# Patient Record
Sex: FEMALE | Race: WHITE
[De-identification: names, ages, dates, MRNs, and addresses within clinical notes are randomized per-mention and may not be internally consistent; named-entity substitution may affect disease eponyms.]

---

## 2020-09-17 ENCOUNTER — HOSPITAL ENCOUNTER (INPATIENT)
Dept: HOSPITAL 54 - GPS | Age: 75
LOS: 19 days | Discharge: NURSING, REHAB OR LONG-TERM CARE | DRG: 885 | End: 2020-10-06
Attending: NURSE PRACTITIONER | Admitting: PSYCHIATRY & NEUROLOGY
Payer: MEDICARE

## 2020-09-17 VITALS — DIASTOLIC BLOOD PRESSURE: 78 MMHG | SYSTOLIC BLOOD PRESSURE: 148 MMHG

## 2020-09-17 VITALS — HEIGHT: 65 IN | BODY MASS INDEX: 43.65 KG/M2 | WEIGHT: 262 LBS
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
## 2020-09-17 DIAGNOSIS — E78.5: ICD-10-CM

## 2020-09-17 DIAGNOSIS — M17.0: ICD-10-CM

## 2020-09-17 DIAGNOSIS — T50.2X5A: ICD-10-CM

## 2020-09-17 DIAGNOSIS — Z91.19: ICD-10-CM

## 2020-09-17 DIAGNOSIS — Z73.6: ICD-10-CM

## 2020-09-17 DIAGNOSIS — F03.90: ICD-10-CM

## 2020-09-17 DIAGNOSIS — E87.6: ICD-10-CM

## 2020-09-17 DIAGNOSIS — E83.39: ICD-10-CM

## 2020-09-17 DIAGNOSIS — I10: ICD-10-CM

## 2020-09-17 DIAGNOSIS — E83.42: ICD-10-CM

## 2020-09-17 DIAGNOSIS — E11.9: ICD-10-CM

## 2020-09-17 DIAGNOSIS — D68.59: ICD-10-CM

## 2020-09-17 DIAGNOSIS — F29: Primary | ICD-10-CM

## 2020-09-17 DIAGNOSIS — F01.50: ICD-10-CM

## 2020-09-17 DIAGNOSIS — Z90.710: ICD-10-CM

## 2020-09-17 DIAGNOSIS — E66.01: ICD-10-CM

## 2020-09-17 DIAGNOSIS — F22: ICD-10-CM

## 2020-09-17 DIAGNOSIS — G93.40: ICD-10-CM

## 2020-09-17 DIAGNOSIS — Z91.14: ICD-10-CM

## 2020-09-17 DIAGNOSIS — E43: ICD-10-CM

## 2020-09-17 RX ADMIN — ACETAMINOPHEN PRN MG: 325 TABLET ORAL at 22:05

## 2020-09-17 NOTE — NUR
GPS ADMISSION NOTE :

RECEIVED PATIENT FROM [facility] MARIA A CHRISTOPHER . PATIENT ARRIVED ON 
THIS UNIT AT VIA WHEELCHAIR WITH 1 EMT ESCORT.  PATIENT ADMITTED ON A 5150 HOLD FOR DTS,GD.  
PER HOLD PATIENT DELUSIONAL  SHE BELIEVES SHE NEEDS TO DRIVE TO THE LOCAL AIR PORT BECAUSE 
HER SON IS SENDING A PRIVATE JUT FOR HER  PATIENT UNABLE TO PROVIDE A SAFE PLAN AT THIS TIME 
  THE 5150 WAS REVIEWED AND THE DOCUMENTATION IN THE 5150 HOLD APPEARS TO REFLECT THE 
PRESENTATION OF THE PATIENT.  UPON FACE TO FACE ASSESSMENT PATIENT IS CURRENTLY IN BED 
AWAKE, HAS NO S/S OR COMPLAINTS OF PAIN.  PATIENT IS DISPLAYING NO S/S OF APPARENT DISTRESS. 
 PATIENT BREATHING IS UNLABORED WITH EQUAL RISE AND FALL OF THE CHEST.  PATIENT IS ALERT AND 
ORIENTATED X 2 ON ROOM AIR.  PATIENT ASSISTED WITH TURING AND REPOSITIONING Q2HR AND PRN FOR 
COMFORT AND CIRCULATION.  PATIENT HAS NO NEEDS AT THIS TIME.  PATIENT IS NOTED TO BEING 
DELUSIONAL, DISHEVELED, DISORGANIZED, COOPERATIVE, AND NEEDS REDIRECTION.  PATIENT DENIES 
SUICIDE IDEATIONS AND HOMICIDAL IDEATIONS AT THIS TIME.  PATIENT IS UNDER THE PSYCHIATRIC 
CARE OF DR. DARNELL AND THE MEDICAL CARE OF DR BARRETO.  PATIENT BELONGINGS WERE INVENTORIED 
AND CHECKED FOR CONTRABAND.  ALL CONTRABAND REMOVED AND STORED IN PATIENT HALLWAY LOCKER.  
PATIENT ADVANCED DIRECTIVES PREFERENCE, IMMUNIZATIONS QUESTIONER COMPLETED.  PATIENT 
EDUCATED ON THE USE OF THE CALL BELL.  PATIENT BED SIDE RAILS ARE UP X 2 FOR SAFETY.  
PATIENT BED IS LOCKED, LOW AND I WILL CONTINUE TO MONITOR THIS PATIENT Q 15 MIN WITH THE 
HELP OF STAFF TO MAINTAIN SAFETY.  WILL INDORSE TO INCOMING SHIFT NURSE OF FULL ADMISSION 
AND CONTINUATION OF CARE

## 2020-09-17 NOTE — NUR
GPS-RN NOTE: C/O BILATERAL KNEE PAIN



PATIENT C/O BILATERAL KNEE PAIN ON A PAIN SCALE OF 3/10. ADMINISTERED ACETAMINOPHEN 650MG PO 
AS ORDERED. WILL CONTINUE TO REASSESS.

## 2020-09-18 VITALS — SYSTOLIC BLOOD PRESSURE: 121 MMHG | DIASTOLIC BLOOD PRESSURE: 51 MMHG

## 2020-09-18 VITALS — DIASTOLIC BLOOD PRESSURE: 67 MMHG | SYSTOLIC BLOOD PRESSURE: 140 MMHG

## 2020-09-18 VITALS — DIASTOLIC BLOOD PRESSURE: 62 MMHG | SYSTOLIC BLOOD PRESSURE: 140 MMHG

## 2020-09-18 LAB
ALBUMIN SERPL BCP-MCNC: 2.6 G/DL (ref 3.4–5)
ALP SERPL-CCNC: 58 U/L (ref 46–116)
ALT SERPL W P-5'-P-CCNC: 22 U/L (ref 12–78)
APPEARANCE UR: CLEAR
AST SERPL W P-5'-P-CCNC: 24 U/L (ref 15–37)
BASOPHILS # BLD AUTO: 0 /CMM (ref 0–0.2)
BASOPHILS NFR BLD AUTO: 0.3 % (ref 0–2)
BILIRUB SERPL-MCNC: 0.6 MG/DL (ref 0.2–1)
BILIRUB UR QL STRIP: NEGATIVE
BUN SERPL-MCNC: 5 MG/DL (ref 7–18)
BUN SERPL-MCNC: 6 MG/DL (ref 7–18)
CALCIUM SERPL-MCNC: 8.6 MG/DL (ref 8.5–10.1)
CALCIUM SERPL-MCNC: 8.6 MG/DL (ref 8.5–10.1)
CHLORIDE SERPL-SCNC: 101 MMOL/L (ref 98–107)
CHLORIDE SERPL-SCNC: 101 MMOL/L (ref 98–107)
CHOLEST SERPL-MCNC: 130 MG/DL (ref ?–200)
CO2 SERPL-SCNC: 29 MMOL/L (ref 21–32)
CO2 SERPL-SCNC: 30 MMOL/L (ref 21–32)
COLOR UR: YELLOW
CREAT SERPL-MCNC: 0.5 MG/DL (ref 0.6–1.3)
CREAT SERPL-MCNC: 0.6 MG/DL (ref 0.6–1.3)
EOSINOPHIL NFR BLD AUTO: 0.4 % (ref 0–6)
GLUCOSE SERPL-MCNC: 117 MG/DL (ref 74–106)
GLUCOSE SERPL-MCNC: 98 MG/DL (ref 74–106)
GLUCOSE UR STRIP-MCNC: NEGATIVE MG/DL
HCT VFR BLD AUTO: 37 % (ref 33–45)
HDLC SERPL-MCNC: 65 MG/DL (ref 40–60)
HGB BLD-MCNC: 12.5 G/DL (ref 11.5–14.8)
HGB UR QL STRIP: NEGATIVE ERY/UL
KETONES UR STRIP-MCNC: NEGATIVE MG/DL
LDLC SERPL DIRECT ASSAY-MCNC: 50 MG/DL (ref 0–99)
LEUKOCYTE ESTERASE UR QL STRIP: NEGATIVE
LYMPHOCYTES NFR BLD AUTO: 1.4 /CMM (ref 0.8–4.8)
LYMPHOCYTES NFR BLD AUTO: 25.9 % (ref 20–44)
MAGNESIUM SERPL-MCNC: 1.7 MG/DL (ref 1.8–2.4)
MCHC RBC AUTO-ENTMCNC: 34 G/DL (ref 31–36)
MCV RBC AUTO: 89 FL (ref 82–100)
MONOCYTES NFR BLD AUTO: 0.8 /CMM (ref 0.1–1.3)
MONOCYTES NFR BLD AUTO: 14.9 % (ref 2–12)
NEUTROPHILS # BLD AUTO: 3.1 /CMM (ref 1.8–8.9)
NEUTROPHILS NFR BLD AUTO: 58.5 % (ref 43–81)
NITRITE UR QL STRIP: NEGATIVE
PH UR STRIP: 6.5 [PH] (ref 5–8)
PHOSPHATE SERPL-MCNC: 1.8 MG/DL (ref 2.5–4.9)
PLATELET # BLD AUTO: 229 /CMM (ref 150–450)
POTASSIUM SERPL-SCNC: 2.3 MMOL/L (ref 3.5–5.1)
POTASSIUM SERPL-SCNC: 2.6 MMOL/L (ref 3.5–5.1)
PROT SERPL-MCNC: 6.4 G/DL (ref 6.4–8.2)
PROT UR QL STRIP: NEGATIVE MG/DL
RBC # BLD AUTO: 4.19 MIL/UL (ref 4–5.2)
RBC #/AREA URNS HPF: (no result) /HPF (ref 0–2)
SODIUM SERPL-SCNC: 139 MMOL/L (ref 136–145)
SODIUM SERPL-SCNC: 140 MMOL/L (ref 136–145)
TRIGL SERPL-MCNC: 69 MG/DL (ref 30–150)
UROBILINOGEN UR STRIP-MCNC: 1 EU/DL
WBC #/AREA URNS HPF: (no result) /HPF (ref 0–3)
WBC NRBC COR # BLD AUTO: 5.3 K/UL (ref 4.3–11)

## 2020-09-18 RX ADMIN — Medication SCH EACH: at 09:10

## 2020-09-18 RX ADMIN — MAGNESIUM OXIDE TAB 400 MG (241.3 MG ELEMENTAL MG) SCH MG: 400 (241.3 MG) TAB at 09:17

## 2020-09-18 RX ADMIN — AMLODIPINE BESYLATE SCH MG: 5 TABLET ORAL at 09:13

## 2020-09-18 RX ADMIN — Medication SCH PACKET: at 09:55

## 2020-09-18 RX ADMIN — Medication SCH MG: at 09:13

## 2020-09-18 RX ADMIN — MAGNESIUM OXIDE TAB 400 MG (241.3 MG ELEMENTAL MG) SCH MG: 400 (241.3 MG) TAB at 16:14

## 2020-09-18 RX ADMIN — ATENOLOL SCH MG: 50 TABLET ORAL at 09:14

## 2020-09-18 RX ADMIN — ATORVASTATIN CALCIUM SCH MG: 40 TABLET, FILM COATED ORAL at 17:13

## 2020-09-18 RX ADMIN — Medication SCH PACKET: at 16:14

## 2020-09-18 RX ADMIN — Medication SCH TAB: at 09:14

## 2020-09-18 RX ADMIN — Medication SCH PACKET: at 13:47

## 2020-09-18 NOTE — NUR
GPS-RN NOTE: 



CALLED NP MARY LOU REPORTED CRITICAL LAB RESULT OF POTASSIUM 2.3 AND LOW MAGNESIUM OF 1.7. 
RECEIVED ORDERS NOTED AND CARRIED OUT. WILL ENDORSE TO THE DAY SHIFT NURSE FOR CONTINUITY OF 
CARE.

## 2020-09-18 NOTE — NUR
ROOPA Initial Discharge Plan: Patient currently resides [address] home [address] [place] 
[address]; ([phone]). Per pt, she resides with her daughter Sarah ([phone]). This 
writer attempted to contact Sarah, but she was unavailable to gather collateral. This 
writer left a voicemail to contact ROOPA. This writer will work with the MD and treatment team 
to coordinate proper discharge plan.

## 2020-09-18 NOTE — NUR
SW Family Contact:



This writer attempted to contact patient's daughter Sarah ([phone]) to gather 
collateral, however, she was unavailable and this writer left a voicemail.

## 2020-09-19 VITALS — SYSTOLIC BLOOD PRESSURE: 115 MMHG | DIASTOLIC BLOOD PRESSURE: 94 MMHG

## 2020-09-19 VITALS — DIASTOLIC BLOOD PRESSURE: 75 MMHG | SYSTOLIC BLOOD PRESSURE: 129 MMHG

## 2020-09-19 VITALS — SYSTOLIC BLOOD PRESSURE: 106 MMHG | DIASTOLIC BLOOD PRESSURE: 71 MMHG

## 2020-09-19 VITALS — DIASTOLIC BLOOD PRESSURE: 87 MMHG | SYSTOLIC BLOOD PRESSURE: 122 MMHG

## 2020-09-19 LAB
ALBUMIN SERPL BCP-MCNC: 2.9 G/DL (ref 3.4–5)
ALP SERPL-CCNC: 68 U/L (ref 46–116)
ALT SERPL W P-5'-P-CCNC: 25 U/L (ref 12–78)
AST SERPL W P-5'-P-CCNC: 21 U/L (ref 15–37)
BASOPHILS # BLD AUTO: 0 /CMM (ref 0–0.2)
BASOPHILS NFR BLD AUTO: 0.3 % (ref 0–2)
BILIRUB SERPL-MCNC: 0.5 MG/DL (ref 0.2–1)
BUN SERPL-MCNC: 5 MG/DL (ref 7–18)
CALCIUM SERPL-MCNC: 8.7 MG/DL (ref 8.5–10.1)
CHLORIDE SERPL-SCNC: 99 MMOL/L (ref 98–107)
CO2 SERPL-SCNC: 31 MMOL/L (ref 21–32)
CREAT SERPL-MCNC: 0.8 MG/DL (ref 0.6–1.3)
EOSINOPHIL NFR BLD AUTO: 0.3 % (ref 0–6)
GLUCOSE SERPL-MCNC: 121 MG/DL (ref 74–106)
HCT VFR BLD AUTO: 40 % (ref 33–45)
HGB BLD-MCNC: 13.2 G/DL (ref 11.5–14.8)
LYMPHOCYTES NFR BLD AUTO: 1.8 /CMM (ref 0.8–4.8)
LYMPHOCYTES NFR BLD AUTO: 38.3 % (ref 20–44)
LYMPHOCYTES NFR BLD MANUAL: 22 % (ref 16–48)
MAGNESIUM SERPL-MCNC: 1.7 MG/DL (ref 1.8–2.4)
MCHC RBC AUTO-ENTMCNC: 33 G/DL (ref 31–36)
MCV RBC AUTO: 89 FL (ref 82–100)
MONOCYTES NFR BLD AUTO: 0.7 /CMM (ref 0.1–1.3)
MONOCYTES NFR BLD AUTO: 15.9 % (ref 2–12)
MONOCYTES NFR BLD MANUAL: 15 % (ref 0–11)
NEUTROPHILS # BLD AUTO: 2.1 /CMM (ref 1.8–8.9)
NEUTROPHILS NFR BLD AUTO: 45.2 % (ref 43–81)
NEUTS SEG NFR BLD MANUAL: 63 % (ref 42–76)
PHOSPHATE SERPL-MCNC: 2.5 MG/DL (ref 2.5–4.9)
PLATELET # BLD AUTO: 251 /CMM (ref 150–450)
POTASSIUM SERPL-SCNC: 2.7 MMOL/L (ref 3.5–5.1)
PROT SERPL-MCNC: 7 G/DL (ref 6.4–8.2)
RBC # BLD AUTO: 4.43 MIL/UL (ref 4–5.2)
SODIUM SERPL-SCNC: 136 MMOL/L (ref 136–145)
WBC NRBC COR # BLD AUTO: 4.6 K/UL (ref 4.3–11)

## 2020-09-19 RX ADMIN — Medication SCH EACH: at 09:00

## 2020-09-19 RX ADMIN — Medication SCH TAB: at 09:56

## 2020-09-19 RX ADMIN — ATENOLOL SCH MG: 50 TABLET ORAL at 12:30

## 2020-09-19 RX ADMIN — Medication SCH MG: at 09:56

## 2020-09-19 RX ADMIN — ATORVASTATIN CALCIUM SCH MG: 40 TABLET, FILM COATED ORAL at 18:19

## 2020-09-19 RX ADMIN — MAGNESIUM OXIDE TAB 400 MG (241.3 MG ELEMENTAL MG) SCH MG: 400 (241.3 MG) TAB at 18:18

## 2020-09-19 RX ADMIN — AMLODIPINE BESYLATE SCH MG: 5 TABLET ORAL at 16:19

## 2020-09-19 RX ADMIN — MAGNESIUM OXIDE TAB 400 MG (241.3 MG ELEMENTAL MG) SCH MG: 400 (241.3 MG) TAB at 09:56

## 2020-09-19 RX ADMIN — Medication SCH EACH: at 09:54

## 2020-09-19 NOTE — NUR
GPS RN NOTE: ANXIETY

PT WAS FEELING RESTLESS AND IRRITABLE, ASKED PT IF SHE'D LIKE TO TRY ATIVAN TO HELP WITH HER 
COMPLAINT SHE SAID "SURE LET ME TRY THAT". ADMIN PRN ATIVAN. WILL REASSESS AND CONTINUE TO 
MONITOR Q15MIN FOR SAFETY AND BEHAVIOR

## 2020-09-19 NOTE — NUR
GPS RN NOTE: SKIN ASSESSMENT

9/18/20 @ 0364 NOTICED WHEN THE PT WAS WALKING TO RESTROOM HER FEET WERE SWOLLEN, PTS 
FEET/ANKLE WERE +3, NON-PITTING EDEMA. PLACED PICTURES IN CHART, WOUND CONSULT NOT NEEDED AT 
THIS TIME. WILL PASS IT ON TO MORNING NURSE TO NOTIFY INTERNIST WHEN MAKING ROUNDS, PT 
COMPLAINTS OF NO PAIN OTHER THAN WHEN APPLYING PRESSURE. ELEVATED PTS LEGS, WILL CONTINUE TO 
MONITOR Q15MIN FOR SAFETY AND BEHAVIOR AND ROTATE SITE E. 2HRS.

## 2020-09-19 NOTE — NUR
fransisca np aware potassium level 2.7 with lab draw this afternoon,ordered labs for 
tomorrow.lab drawn late only short time after kcl given.

## 2020-09-19 NOTE — NUR
GPS RN NOTE: REFUSED RISPERDAL



PATIENT REFUSED TO TAKE RISPERDAL AT 2200 AS SCHEDULED. SEEN BY DR. DARNELL AT THIS TIME & 
MD EXPLAINED THAT MEDICINE WILL HELP HER TO GET BETTER BUT PATIENT KEPT SAYING," I WANT TO 
GET OFF OF MEDICATION, I DON'T NEED IT." DESPITE OF RISKS & BENEFITS EXPLANATIONS, PATIENT 
CONTINUED TO REFUSE RISPERDAL AS SCHEDULED.

## 2020-09-19 NOTE — NUR
GPS RN NOTE: PT REFUSED BLOOD DRAW

PT REFUSED BLOOD DRAW, REMINDED PT 3X OF THE REASON FOR BLOOD DRAW, THE NEED TO DO SO 
ESPECIALLY DUE TO K+ AND MG LEVELS BEING LOW YESTERDAY BUT PT STILL REFUSED, LAB SAID THEY 
WILL COME BACK IN A FEW HOURS TO TRY AGAIN. I WILL LET UPCOMING NURSE KNOW.

## 2020-09-20 VITALS — DIASTOLIC BLOOD PRESSURE: 43 MMHG | SYSTOLIC BLOOD PRESSURE: 118 MMHG

## 2020-09-20 VITALS — SYSTOLIC BLOOD PRESSURE: 142 MMHG | DIASTOLIC BLOOD PRESSURE: 68 MMHG

## 2020-09-20 VITALS — DIASTOLIC BLOOD PRESSURE: 82 MMHG | SYSTOLIC BLOOD PRESSURE: 140 MMHG

## 2020-09-20 LAB
BUN SERPL-MCNC: 4 MG/DL (ref 7–18)
CALCIUM SERPL-MCNC: 8.9 MG/DL (ref 8.5–10.1)
CHLORIDE SERPL-SCNC: 104 MMOL/L (ref 98–107)
CO2 SERPL-SCNC: 32 MMOL/L (ref 21–32)
CREAT SERPL-MCNC: 0.6 MG/DL (ref 0.6–1.3)
GLUCOSE SERPL-MCNC: 102 MG/DL (ref 74–106)
MAGNESIUM SERPL-MCNC: 1.8 MG/DL (ref 1.8–2.4)
PHOSPHATE SERPL-MCNC: 2.6 MG/DL (ref 2.5–4.9)
POTASSIUM SERPL-SCNC: 2.8 MMOL/L (ref 3.5–5.1)
SODIUM SERPL-SCNC: 142 MMOL/L (ref 136–145)

## 2020-09-20 RX ADMIN — Medication SCH MG: at 09:20

## 2020-09-20 RX ADMIN — Medication SCH TAB: at 09:20

## 2020-09-20 RX ADMIN — ATENOLOL SCH MG: 50 TABLET ORAL at 09:00

## 2020-09-20 RX ADMIN — POTASSIUM CHLORIDE SCH MEQ: 1500 TABLET, EXTENDED RELEASE ORAL at 10:29

## 2020-09-20 RX ADMIN — POTASSIUM CHLORIDE SCH MEQ: 1500 TABLET, EXTENDED RELEASE ORAL at 11:22

## 2020-09-20 RX ADMIN — ATORVASTATIN CALCIUM SCH MG: 40 TABLET, FILM COATED ORAL at 16:11

## 2020-09-20 RX ADMIN — Medication SCH EACH: at 09:20

## 2020-09-20 RX ADMIN — ACETAMINOPHEN PRN MG: 325 TABLET ORAL at 05:50

## 2020-09-20 RX ADMIN — POTASSIUM CHLORIDE SCH MEQ: 1500 TABLET, EXTENDED RELEASE ORAL at 13:08

## 2020-09-20 RX ADMIN — AMLODIPINE BESYLATE SCH MG: 5 TABLET ORAL at 09:22

## 2020-09-20 RX ADMIN — TEMAZEPAM PRN MG: 7.5 CAPSULE ORAL at 22:43

## 2020-09-20 NOTE — NUR
GPS RN NOTE: INSOMNIA



PATIENT IS UNABLE TO SLEEP, ANXIOUS, HYPERVERBAL, PARANOID AT THIS TIME. PRN RESTORIL 7.5 MG 
1 CAP PO GIVEN. WILL CONTINUE TO MONITOR FOR EFFECTIVENESS.

## 2020-09-20 NOTE — NUR
GPS RN NOTE: LEFT KNEE PAIN



PATIENT C/O LEFT KNEE PAIN 3/10 & WANTED TO TAKE TYLENOL. PRN TYLENOL GIVEN AS ORDERED. WILL 
CONTINUE TO MONITOR.

## 2020-09-21 VITALS — SYSTOLIC BLOOD PRESSURE: 130 MMHG | DIASTOLIC BLOOD PRESSURE: 58 MMHG

## 2020-09-21 LAB
BUN SERPL-MCNC: 4 MG/DL (ref 7–18)
CALCIUM SERPL-MCNC: 8.9 MG/DL (ref 8.5–10.1)
CHLORIDE SERPL-SCNC: 103 MMOL/L (ref 98–107)
CO2 SERPL-SCNC: 30 MMOL/L (ref 21–32)
CREAT SERPL-MCNC: 0.6 MG/DL (ref 0.6–1.3)
GLUCOSE SERPL-MCNC: 95 MG/DL (ref 74–106)
MAGNESIUM SERPL-MCNC: 2 MG/DL (ref 1.8–2.4)
PHOSPHATE SERPL-MCNC: 2.3 MG/DL (ref 2.5–4.9)
POTASSIUM SERPL-SCNC: 3.4 MMOL/L (ref 3.5–5.1)
SODIUM SERPL-SCNC: 140 MMOL/L (ref 136–145)

## 2020-09-21 RX ADMIN — Medication SCH TAB: at 08:23

## 2020-09-21 RX ADMIN — Medication SCH EACH: at 08:23

## 2020-09-21 RX ADMIN — ATENOLOL SCH MG: 50 TABLET ORAL at 08:28

## 2020-09-21 RX ADMIN — Medication SCH MG: at 08:24

## 2020-09-21 RX ADMIN — ATORVASTATIN CALCIUM SCH MG: 40 TABLET, FILM COATED ORAL at 18:00

## 2020-09-21 RX ADMIN — AMLODIPINE BESYLATE SCH MG: 5 TABLET ORAL at 08:23

## 2020-09-21 NOTE — NUR
RN NOTES



PATIENT RECEIVED IN BED RESTING COMFORTABLY, ALERT AND ORIENTED X 2. HYPERVERBAL SPEECH 
PRESENT AT THIS TIME, SPEAKING ABOUT GOD, AND HER Jew COMMUNITY, STATING SHE NEEDS TO 
LEAVE. PATIENT ON ROOM AIR WITH NO RESPIRATORY DISTRESS PRESENT AT THIS TIME WITH EVEN 
NON-LABORED BREATHING, WITH NO SOB NOTED. NO APPARENT DISTRESS AT THIS TIME. PATIENT 
PRESENTS WITH NO PAIN OR DISCOMFORT. SAFETY PRECAUTIONS IMPLEMENTED WITH BED LOCKED, BED IN 
THE LOWEST POSITION, BILATERAL SIDE RAILS UP, BED ALARM ON. WILL CONTINUE TO MONITOR 
PATIENT.

## 2020-09-21 NOTE — NUR
RN NOTES



PATIENT HAS SCHEDULE PO POTASSIUM CHLORIDE 20 mEq AS ORDERED, PATIENT REFUSED MEDICATION 
STATING "I DON'T NEED THIS AND NEED TO SEE MY DR, I AM REFUSING THIS MEDICATION." EDUCATED 
THE PATIENT THE REASON FOR MEDICATION AND RISKS AND BENEFITS. PATIENT KEPT REFUSING MULTIPLE 
TIMES. CHARGE NURSE MADE AWARE AND WILL CONTINUE TO MONITOR PATIENT.

## 2020-09-21 NOTE — NUR
RN NOTES



PATIENT REFUSED NEUTRA PHOS K 500mg AS ORDERED. EXPLAINED AND EDUCATED THE PATIENT THE RISKS 
AND BENEFITS OF TAKING THIS MEDICATIONS, AND PATIENT KEPT REFUSING MULTIPLE TIMES. KEPT ON 
EDUCATING AND PATIENT DID NOT WANT TO HEAR IT AND STATING " I AM NOT TAKING ANY 
MEDICATIONS." WILL CONTINUE TO MONITOR PATIENT.

## 2020-09-21 NOTE — NUR
GPS RN NOTES: REFUSED PM MEDS

PT REFUSED RISPERDAL 0.5MG PO AS ORDERED. PT STATED, "THE LORD TELLS ME WHAT TO DO. NOT 
YOU." EXPLAIN RISKS AND BENEFITS. PT STILL REFUSED X3. CONTINUE TO MONITOR.

## 2020-09-21 NOTE — NUR
GPS RN NOTES:

UPON DOING ROUNDS PT AWAKE. OFFERED PT SLEEPING MEDICATION RESTORIL. PT REFUSED AND STATED, 
"NO. I WILL NOT TAKE ANYTHING YOU WILL GIVE ME. THE LORD IS IN MY PRAYERS." EXPLAIN RISKS 
AND BENEFITS OF SLEEP. PT STILL REFUSED X3. CONTINUE TO MONITOR.

## 2020-09-21 NOTE — NUR
Family Contact: Pt's daughter Sarah ([phone]) called the SW and stated that she 
wanted an update regarding the pts diagnosis and discharge plan. SW informed her of the 
diagnosis and stated that she is going to be sending out referrals for the pt today. SW 
stated that she will update her the following day with an accepting facility. Pts daughter 
reinforced that this placement is going to be temporary as the plan is to discharge the pt 
to a facility in [place].

## 2020-09-21 NOTE — NUR
SNF Referral: ROOPA faxed a referral to [facility] with attention to 
Blessing to the fax number: [phone].

## 2020-09-21 NOTE — NUR
RN NOTES



PATIENT REFUSED MEDICATION ATORVASTATIN PO 40mg AS ORDERED, STATING "I DON'T WANT TO TAKE 
ANY MEDICATIONS!" EDUCATED THE PATIENT THE BENEFITS AND RISKS OF NOT TAKING THE MEDICATION 
MULTIPLE TIMES AND PATIENT KEPT REFUSING. WILL CONTINUE TO MONITOR PATIENT AT THIS TIME.

## 2020-09-22 VITALS — SYSTOLIC BLOOD PRESSURE: 129 MMHG | DIASTOLIC BLOOD PRESSURE: 61 MMHG

## 2020-09-22 VITALS — DIASTOLIC BLOOD PRESSURE: 50 MMHG | SYSTOLIC BLOOD PRESSURE: 113 MMHG

## 2020-09-22 RX ADMIN — ATORVASTATIN CALCIUM SCH MG: 40 TABLET, FILM COATED ORAL at 18:00

## 2020-09-22 RX ADMIN — Medication SCH MG: at 09:00

## 2020-09-22 RX ADMIN — Medication SCH TAB: at 09:00

## 2020-09-22 RX ADMIN — AMLODIPINE BESYLATE SCH MG: 5 TABLET ORAL at 09:00

## 2020-09-22 RX ADMIN — ATENOLOL SCH MG: 50 TABLET ORAL at 09:00

## 2020-09-22 RX ADMIN — Medication SCH EACH: at 09:00

## 2020-09-22 NOTE — NUR
GPS RN NOTES: 

UPON DOING ROUNDS PT WOKE UP BANGING ON ROOM WINDOW, THROWING SHEETS AND PILLOW, PT VERBALLY 
ABUSIVE TOWARDS STAFF, AND ATTEMPTING TO HIT STAFF. ENCOURAGE TO EXPRESS THOUGHTS AND 
FEELINGS TOWARDS STAFF. PT YELLING, SCREAMING,LAUGHING, CRYING, SINGING, CURSING AND 
PRAISING. PT INCREASED AGITATION, AGGRESSIVE, AND DANGER TO OTHERS. PAGED DR DARNELL [id number]. 
AWAITING FOR CALL AND NEW ORDERS. AT 0337 DR DARNELL CALLED BACK WITH NEW ORDERS OF 
EMERGENCY SHOT HALDOL 2MG IM X1 AND ATIVAN 1MG IM X1. ORDERS NOTED AND CARRIED OUT. BEFORE 
GIVING MEDS, PT REFUSED VITALS TO BE TAKEN. NO SOB. NO RESP DISTRESS. BREATHING EVEN AND 
UNLABORED. PT BECAME MORE AGITATED.  ADMINISTERED MEDICATION IM ON PTS RIGHT GLUTEAL MUSCLE 
PER ORDER PHYSICALLY HELD BY CNA AND SECURITY. TOLERATED MEDICATION WELL. CONTINUE TO 
MONITOR Q15 MIN           

-------------------------------------------------------------------------------

Addendum: 09/22/20 at 0611 by KEVIN YARBROUGH RN

-------------------------------------------------------------------------------

PT PULLING OUT ROOM CURTAIN BY THE WINDOW

## 2020-09-22 NOTE — NUR
GPS RN NOTES: 

RECEIVED CALL BACK FROM DR DARNELL WITH N.O. ORDERS FOR IM EMERGENCY SHOT ATIVAN 1MG IM X1 
AND HALDOL 2MG X1. PT REFUSED VITALS. NO SOB. NO RESP DISTRESS.  ADMINISTERED MEDICATION 
THROUGH PTS GLUTEAL LEFT MUSCLE. TOLERATED MEDICATION WELL. CONTINUE TO MONITOR.

## 2020-09-22 NOTE — NUR
GPS RN NOTES: 

UPON DOING ROUNDS PT TRYING TO GET OUT OF BED AND PRAISING LOUDLY. ENCOURAGE PT EXPRESS 
THOUGHTS AND FEELINGS. PT YELLING, VERBALLY AGGRESSIVE TOWARDS STAFF, AND THROWING LINEN. PT 
INCREASED AGITATION AND STATED, "IN THE NAME OF LORD GET OUT OF HERE. " PAGED DR DARNELL AND 
LEFT A MESSAGE AND AWAITING FOR MD TO CALL BACK. WILL CONTINUE TO MONITOR PT.

## 2020-09-22 NOTE — NUR
RN NOTE- PT COMBATIVE OPPOSITIONAL TO CARE AND RX. PSYCHOTIC AND DELUSIONAL. CURRENTLY QUIET 
AFTER TWO EARLY AM EMERGENCY RX IM SHOTS. STILL W DELUSIONS REGARDING GER PASQUALE AND THE 
DEVIL. MONITORING BEHAVIORS CLOSELY

## 2020-09-22 NOTE — NUR
RN NOTE- PT RECEIVED EMERGENCY IM AT SHIFT CHANGE. PT QUIET SINCE THOUGH IRRITABLE AND 
OPPOSITIONAL TO ANY CARE. PT DELUSIONAL AND PSYCHOTIC. REFUSING ALL FOOD DRINK AND MEDS. 
MONITORING BEHAVIOR

## 2020-09-22 NOTE — NUR
RN NOTE- PT FOUND TO HAVE TIED SHEET IN LOOP AROUND SHOULDERS AND NECK . ORDERED TO REMOVE. 
PT REFUSED. BECAME AGITATED. DR DARNELL AND ALEX NOTIFIED. SECURITY TO UNIT. REMOVED 
SHEET W STAFF. DR DARNELL ORDERED REISE PAPERWORK AND EMERGENCY IM ATIVAN 2 MG AND HALDOL 5 
MG. COMPLIYING.

## 2020-09-22 NOTE — NUR
GPS RN NOTES: REFUSED LABS

PT REFUSED AM LABS. EXPLAIN RISK AND BENEFITS. PT REFUSED X3. PT STATED, "NO NOT NOW. LET ME 
BE " WILL ENDORSE TO DAY SHIFT. CONTINUE TO MONITOR

## 2020-09-23 VITALS — DIASTOLIC BLOOD PRESSURE: 67 MMHG | SYSTOLIC BLOOD PRESSURE: 135 MMHG

## 2020-09-23 VITALS — SYSTOLIC BLOOD PRESSURE: 152 MMHG | DIASTOLIC BLOOD PRESSURE: 83 MMHG

## 2020-09-23 VITALS — DIASTOLIC BLOOD PRESSURE: 63 MMHG | SYSTOLIC BLOOD PRESSURE: 157 MMHG

## 2020-09-23 LAB
ALBUMIN SERPL BCP-MCNC: 2.6 G/DL (ref 3.4–5)
ALP SERPL-CCNC: 70 U/L (ref 46–116)
ALT SERPL W P-5'-P-CCNC: 26 U/L (ref 12–78)
AST SERPL W P-5'-P-CCNC: 42 U/L (ref 15–37)
BASOPHILS # BLD AUTO: 0 /CMM (ref 0–0.2)
BASOPHILS NFR BLD AUTO: 0.2 % (ref 0–2)
BILIRUB SERPL-MCNC: 0.4 MG/DL (ref 0.2–1)
BUN SERPL-MCNC: 6 MG/DL (ref 7–18)
CALCIUM SERPL-MCNC: 9.1 MG/DL (ref 8.5–10.1)
CHLORIDE SERPL-SCNC: 103 MMOL/L (ref 98–107)
CO2 SERPL-SCNC: 27 MMOL/L (ref 21–32)
CREAT SERPL-MCNC: 0.6 MG/DL (ref 0.6–1.3)
EOSINOPHIL NFR BLD AUTO: 0.3 % (ref 0–6)
GLUCOSE SERPL-MCNC: 116 MG/DL (ref 74–106)
HCT VFR BLD AUTO: 37 % (ref 33–45)
HGB BLD-MCNC: 12.5 G/DL (ref 11.5–14.8)
LYMPHOCYTES NFR BLD AUTO: 1.6 /CMM (ref 0.8–4.8)
LYMPHOCYTES NFR BLD AUTO: 25.9 % (ref 20–44)
MCHC RBC AUTO-ENTMCNC: 33 G/DL (ref 31–36)
MCV RBC AUTO: 90 FL (ref 82–100)
MONOCYTES NFR BLD AUTO: 0.9 /CMM (ref 0.1–1.3)
MONOCYTES NFR BLD AUTO: 14.9 % (ref 2–12)
NEUTROPHILS # BLD AUTO: 3.7 /CMM (ref 1.8–8.9)
NEUTROPHILS NFR BLD AUTO: 58.7 % (ref 43–81)
PLATELET # BLD AUTO: 238 /CMM (ref 150–450)
POTASSIUM SERPL-SCNC: 3.2 MMOL/L (ref 3.5–5.1)
PROT SERPL-MCNC: 6.6 G/DL (ref 6.4–8.2)
RBC # BLD AUTO: 4.15 MIL/UL (ref 4–5.2)
SODIUM SERPL-SCNC: 139 MMOL/L (ref 136–145)
WBC NRBC COR # BLD AUTO: 6.2 K/UL (ref 4.3–11)

## 2020-09-23 RX ADMIN — ATORVASTATIN CALCIUM SCH MG: 40 TABLET, FILM COATED ORAL at 17:42

## 2020-09-23 RX ADMIN — ATENOLOL SCH MG: 50 TABLET ORAL at 09:00

## 2020-09-23 RX ADMIN — Medication SCH MG: at 09:00

## 2020-09-23 RX ADMIN — AMLODIPINE BESYLATE SCH MG: 5 TABLET ORAL at 09:00

## 2020-09-23 RX ADMIN — Medication SCH TAB: at 09:00

## 2020-09-23 RX ADMIN — Medication SCH ML: at 19:30

## 2020-09-23 RX ADMIN — Medication SCH EACH: at 09:00

## 2020-09-23 RX ADMIN — ACETAMINOPHEN PRN MG: 325 TABLET ORAL at 19:53

## 2020-09-23 NOTE — NUR
RN NOTE- PT QUIET TODAY SLEPT WELL LAST NOC CALM THIS MORNING THOUGH STILL OPPOSITIONAL TO 
CARE REFUSING MORNING RX.PO INTAKE GOOD WITHDRAWN

## 2020-09-23 NOTE — NUR
gps rn notes: refused ensure

pt refused ensure. pt stated, "nope. i dont like." explain risks and benefits. pt refused 
x3. continue to monitor.

## 2020-09-23 NOTE — NUR
Family Contact: SW called the pts daughter Sarah ([phone]) and left a voicemail 
stating that the pts hearing was upheld and that the pt will be discharged when the MD 
thinks that she is ready. ROOPA also stated that the pt has been acting out and has not been 
taking her medications so the SW informed her that the MD may have to file for a Riese 
hearing.

## 2020-09-23 NOTE — NUR
GPS RN NOTES: BACK PAIN

PT C/O OF BACK PAIN. OFFERED TYLENOL PRN AS ORDERED. PT AGREED AND TOLERATED MEDICATION 
WELL. CONTINUE TO MONITOR

## 2020-09-24 VITALS — DIASTOLIC BLOOD PRESSURE: 58 MMHG | SYSTOLIC BLOOD PRESSURE: 107 MMHG

## 2020-09-24 VITALS — DIASTOLIC BLOOD PRESSURE: 74 MMHG | SYSTOLIC BLOOD PRESSURE: 152 MMHG

## 2020-09-24 VITALS — SYSTOLIC BLOOD PRESSURE: 107 MMHG | DIASTOLIC BLOOD PRESSURE: 58 MMHG

## 2020-09-24 VITALS — DIASTOLIC BLOOD PRESSURE: 52 MMHG | SYSTOLIC BLOOD PRESSURE: 103 MMHG

## 2020-09-24 LAB
ALBUMIN SERPL BCP-MCNC: 2.9 G/DL (ref 3.4–5)
ALP SERPL-CCNC: 72 U/L (ref 46–116)
ALT SERPL W P-5'-P-CCNC: 34 U/L (ref 12–78)
AST SERPL W P-5'-P-CCNC: 55 U/L (ref 15–37)
BASOPHILS # BLD AUTO: 0 /CMM (ref 0–0.2)
BASOPHILS NFR BLD AUTO: 0.1 % (ref 0–2)
BILIRUB SERPL-MCNC: 0.5 MG/DL (ref 0.2–1)
BUN SERPL-MCNC: 7 MG/DL (ref 7–18)
CALCIUM SERPL-MCNC: 9.4 MG/DL (ref 8.5–10.1)
CHLORIDE SERPL-SCNC: 101 MMOL/L (ref 98–107)
CO2 SERPL-SCNC: 25 MMOL/L (ref 21–32)
CREAT SERPL-MCNC: 0.6 MG/DL (ref 0.6–1.3)
EOSINOPHIL NFR BLD AUTO: 0.2 % (ref 0–6)
GLUCOSE SERPL-MCNC: 128 MG/DL (ref 74–106)
HCT VFR BLD AUTO: 38 % (ref 33–45)
HGB BLD-MCNC: 12.8 G/DL (ref 11.5–14.8)
LYMPHOCYTES NFR BLD AUTO: 1.6 /CMM (ref 0.8–4.8)
LYMPHOCYTES NFR BLD AUTO: 23.9 % (ref 20–44)
MAGNESIUM SERPL-MCNC: 2.2 MG/DL (ref 1.8–2.4)
MCHC RBC AUTO-ENTMCNC: 33 G/DL (ref 31–36)
MCV RBC AUTO: 91 FL (ref 82–100)
MONOCYTES NFR BLD AUTO: 0.8 /CMM (ref 0.1–1.3)
MONOCYTES NFR BLD AUTO: 12.1 % (ref 2–12)
NEUTROPHILS # BLD AUTO: 4.2 /CMM (ref 1.8–8.9)
NEUTROPHILS NFR BLD AUTO: 63.7 % (ref 43–81)
PHOSPHATE SERPL-MCNC: 2.8 MG/DL (ref 2.5–4.9)
PLATELET # BLD AUTO: 227 /CMM (ref 150–450)
POTASSIUM SERPL-SCNC: 3.4 MMOL/L (ref 3.5–5.1)
PROT SERPL-MCNC: 7 G/DL (ref 6.4–8.2)
RBC # BLD AUTO: 4.24 MIL/UL (ref 4–5.2)
SODIUM SERPL-SCNC: 137 MMOL/L (ref 136–145)
WBC NRBC COR # BLD AUTO: 6.6 K/UL (ref 4.3–11)

## 2020-09-24 RX ADMIN — Medication SCH MG: at 08:46

## 2020-09-24 RX ADMIN — Medication SCH ML: at 17:26

## 2020-09-24 RX ADMIN — ATENOLOL SCH MG: 50 TABLET ORAL at 08:46

## 2020-09-24 RX ADMIN — Medication SCH ML: at 08:44

## 2020-09-24 RX ADMIN — ACETAMINOPHEN PRN MG: 325 TABLET ORAL at 16:47

## 2020-09-24 RX ADMIN — Medication SCH ML: at 11:37

## 2020-09-24 RX ADMIN — Medication SCH EACH: at 08:48

## 2020-09-24 RX ADMIN — ATORVASTATIN CALCIUM SCH MG: 40 TABLET, FILM COATED ORAL at 17:27

## 2020-09-24 RX ADMIN — AMLODIPINE BESYLATE SCH MG: 5 TABLET ORAL at 08:45

## 2020-09-24 RX ADMIN — Medication SCH TAB: at 08:45

## 2020-09-24 NOTE — NUR
GPS/RN-NOTES

PATIENT C/O LEFT HIP PAIN ,TYLENOL 650MG P.O GIVEN AS PRN ORDER. WILL CONT. MONITORING FOR 
SAFETY.

## 2020-09-25 VITALS — DIASTOLIC BLOOD PRESSURE: 66 MMHG | SYSTOLIC BLOOD PRESSURE: 142 MMHG

## 2020-09-25 VITALS — DIASTOLIC BLOOD PRESSURE: 68 MMHG | SYSTOLIC BLOOD PRESSURE: 148 MMHG

## 2020-09-25 RX ADMIN — Medication SCH ML: at 17:00

## 2020-09-25 RX ADMIN — Medication SCH ML: at 09:00

## 2020-09-25 RX ADMIN — Medication SCH EACH: at 09:00

## 2020-09-25 RX ADMIN — ATENOLOL SCH MG: 50 TABLET ORAL at 09:00

## 2020-09-25 RX ADMIN — Medication SCH TAB: at 09:00

## 2020-09-25 RX ADMIN — Medication SCH MG: at 09:00

## 2020-09-25 RX ADMIN — Medication SCH ML: at 13:00

## 2020-09-25 RX ADMIN — AMLODIPINE BESYLATE SCH MG: 5 TABLET ORAL at 09:00

## 2020-09-25 RX ADMIN — ATORVASTATIN CALCIUM SCH MG: 40 TABLET, FILM COATED ORAL at 17:32

## 2020-09-25 RX ADMIN — AMLODIPINE BESYLATE SCH MG: 5 TABLET ORAL at 09:12

## 2020-09-25 RX ADMIN — ATENOLOL SCH MG: 50 TABLET ORAL at 09:13

## 2020-09-25 RX ADMIN — Medication SCH MG: at 09:12

## 2020-09-25 NOTE — NUR
Family Contact: Pts daughterKylee ([phone]), and ROOPA explainexd t

-------------------------------------------------------------------------------

Addendum: 09/25/20 at 1439 by ROOPA CHUN

-------------------------------------------------------------------------------

Family Contact: Pts daughterKylee ([phone]), and SW explained that the SW cannot 
release information to her as the pt has not consented. Pts daughter expressed how upset she 
was and SW provided emotional support. ROOPA stated that as soon as the pt agrees she will be 
informed and information will be provided to her.

## 2020-09-25 NOTE — NUR
GPS/RN-NOTES

PATIENT REFUSED ALL 0900 MEDICATIONS DESPITE EXPLANATIONS OF THE RISK AND BENEFITS. STATED " 
I DON'T WANT TO TAKE ANY MEDICATIONS TODAY, LEAVE ME ALONE". OFFERED X3 BUT PATIENT GETS 
ANGRY  AT THE WRITER.

## 2020-09-25 NOTE — NUR
GPS/RN-NOTES

PATIENT REFUSED LIPITOR 4OMG P.O AND ENSURE SUPPLEMENT. STATED" I'M NOT GOING TO EAT OR TAKE 
ANY MEDICATIONS TODAY ". OFFERED X3 , ENCOURAGED TO EAT HER DINNER BUT STILL REFUSED TO EAT.

## 2020-09-25 NOTE — NUR
GPS/RN-NOTES

PATIENT REFUSED RISPERDAL 1MG P.O,STATED" I ALREADY TOLD YOU THAT I'M NOT TAKING 
MEDICATIONS". OFFERED X3

## 2020-09-26 VITALS — DIASTOLIC BLOOD PRESSURE: 73 MMHG | SYSTOLIC BLOOD PRESSURE: 154 MMHG

## 2020-09-26 RX ADMIN — Medication SCH EACH: at 09:00

## 2020-09-26 RX ADMIN — Medication SCH MG: at 09:00

## 2020-09-26 RX ADMIN — Medication SCH ML: at 12:29

## 2020-09-26 RX ADMIN — Medication SCH ML: at 17:00

## 2020-09-26 RX ADMIN — ATENOLOL SCH MG: 50 TABLET ORAL at 09:00

## 2020-09-26 RX ADMIN — Medication SCH ML: at 09:00

## 2020-09-26 RX ADMIN — ACETAMINOPHEN PRN MG: 325 TABLET ORAL at 21:16

## 2020-09-26 RX ADMIN — AMLODIPINE BESYLATE SCH MG: 5 TABLET ORAL at 09:00

## 2020-09-26 RX ADMIN — Medication SCH TAB: at 09:00

## 2020-09-26 RX ADMIN — ATORVASTATIN CALCIUM SCH MG: 40 TABLET, FILM COATED ORAL at 18:00

## 2020-09-26 NOTE — NUR
GPS/RN-NOTES

PATIENT IN BED AWAKE,ALERT,NO ACUTE DISTRESS NOTED. NOTED PATIENT  WITH EPISODE OF TALKING 
TO SELF,ARGUMENTATIVE AND CURSING STAFF PROVIDING CARE . PATIENT ALSO  REFUSING TO EAT AND 
CONTINUE REFUSING HER MEDICATIONS DESPITE EXPLANATIONS  RISK AND BENEFITS.DR. AQUINO MADE 
AWARE AND CHARGE NURSE AWARE. WILL ENDORSE TO INCOMING NURSE/SHIFT TO  CONTINUE MONITORING 
FOR SAFETY AND  BEHAVIOR AND CONTINUITY OF CARE.

## 2020-09-26 NOTE — NUR
NURSE NOTES:



PATIENT REQUESTED FOR TYLENOL MEDICATION, SHE COMPLAINED OF LEFT KNEE PAIN. TYLENOL 650 MG 
GIVEN AS PRN ORDER. WILL MONITOR EFFICACY OF MEDICATION.

## 2020-09-26 NOTE — NUR
MEDICATIONS REFUSAL:



PATIENT REFUSED HER RISPERDAL MEDICATION ON SCHEDULE. PER PATIENT "I DON'T NEED ANY 
MEDICATION RIGHT NOW BECAUSE I AM WELL, IN THE NAME OF GER!". WRITER EXPLAINED THE NEED OF 
SUCH BUT PATIENT KEPT ON REFUSING. WILL INFORM DAY SHIFT NURSE.

## 2020-09-26 NOTE — NUR
GPS/RN-NOTES

PATIENT REFUSED ALL 0900 MEDICATIONS DESPITE EXPLANATIONS OF THE RISK AND BENEFITS. STATED " 
GO AWAY I DON'T TAKE ANY MEDICATIONS TODAY, LEAVE ME ALONE IN GER NAME". OFFERED X3

## 2020-09-27 VITALS — DIASTOLIC BLOOD PRESSURE: 55 MMHG | SYSTOLIC BLOOD PRESSURE: 129 MMHG

## 2020-09-27 RX ADMIN — Medication SCH ML: at 09:43

## 2020-09-27 RX ADMIN — Medication SCH MG: at 09:00

## 2020-09-27 RX ADMIN — Medication SCH EACH: at 09:00

## 2020-09-27 RX ADMIN — AMLODIPINE BESYLATE SCH MG: 5 TABLET ORAL at 09:40

## 2020-09-27 RX ADMIN — Medication SCH ML: at 09:00

## 2020-09-27 RX ADMIN — Medication SCH TAB: at 09:40

## 2020-09-27 RX ADMIN — Medication SCH MG: at 09:41

## 2020-09-27 RX ADMIN — ACETAMINOPHEN PRN MG: 325 TABLET ORAL at 03:49

## 2020-09-27 RX ADMIN — ATENOLOL SCH MG: 50 TABLET ORAL at 09:00

## 2020-09-27 RX ADMIN — Medication SCH TAB: at 09:00

## 2020-09-27 RX ADMIN — Medication SCH EACH: at 09:46

## 2020-09-27 RX ADMIN — ATENOLOL SCH MG: 50 TABLET ORAL at 09:41

## 2020-09-27 RX ADMIN — AMLODIPINE BESYLATE SCH MG: 5 TABLET ORAL at 09:00

## 2020-09-27 RX ADMIN — ATORVASTATIN CALCIUM SCH MG: 40 TABLET, FILM COATED ORAL at 18:00

## 2020-09-27 RX ADMIN — Medication SCH ML: at 12:10

## 2020-09-27 RX ADMIN — Medication SCH ML: at 17:00

## 2020-09-27 NOTE — NUR
GPS RN NOTES: REFUSED RISPERDAL

PT REFUSED RISPERDAL DUE. EXPLAIN RISKS AND BENEFITS. PT STATED," LORD LET ME SLEEP."  PT 
TEARFUL AND EASILY AGITATED. PT STILL REFUSED X3. CONTINUE TO MONITOR.

## 2020-09-27 NOTE — NUR
GPS RN NOTES: REFUSED WEEKLY SKIN ASSESSMENT 

PT REFUSED WEEKLY SKIN ASSESSMENT AND PICTURE. PT EASILY AGITATED.PT STATED, "LET ME SLEEP. 
NOT RIGHT NOW."  EXPLAIN RISKS AND BENEFITS. PT STILL REFUSED X 3. CONTINUE TO MONITOR.

## 2020-09-28 VITALS — SYSTOLIC BLOOD PRESSURE: 138 MMHG | DIASTOLIC BLOOD PRESSURE: 71 MMHG

## 2020-09-28 VITALS — SYSTOLIC BLOOD PRESSURE: 142 MMHG | DIASTOLIC BLOOD PRESSURE: 68 MMHG

## 2020-09-28 RX ADMIN — Medication SCH TAB: at 09:00

## 2020-09-28 RX ADMIN — Medication SCH ML: at 12:03

## 2020-09-28 RX ADMIN — ATORVASTATIN CALCIUM SCH MG: 40 TABLET, FILM COATED ORAL at 17:15

## 2020-09-28 RX ADMIN — Medication SCH ML: at 17:15

## 2020-09-28 RX ADMIN — ATENOLOL SCH MG: 50 TABLET ORAL at 09:00

## 2020-09-28 RX ADMIN — Medication SCH MG: at 09:00

## 2020-09-28 RX ADMIN — AMLODIPINE BESYLATE SCH MG: 5 TABLET ORAL at 09:00

## 2020-09-28 RX ADMIN — Medication SCH EACH: at 09:00

## 2020-09-28 RX ADMIN — Medication SCH ML: at 09:00

## 2020-09-28 NOTE — NUR
RN NOTE- PT OPPOSITIONAL REFUSING CARE AND MEDS. SCREAMING OUT LOUD STRIKING BED FRAME AND 
GESTURING WILDLY AT STAFF. DR DARNELL ORDERED ATIVAN 2 MG AND HALDOL 5 MG IM. COMPLIED. 
EMERGENCY IM GIVEN W STAFF AT BEDSIDE

## 2020-09-28 NOTE — NUR
RN NOTE- PT UNCOOPERATIVE THOUGH CALMER THAN THIS MORNING EMERGENCY IM GIVEN WHICH HELPED. 
PT PRAYS AND CONTINUES W [employer]

## 2020-09-28 NOTE — NUR
GPS RN NOTES:

UPON DOING ROUNDS, PT AWAKE PRAISING IN HER ROOM LAYING IN BED. ASKED PT IF SHE NEEDS TO USE 
THE RESTROOM. PT STATED, "NO. I DID JUST A LITTLE IN MY DIAPER." ATTEMPTED TO CHECK  DIAPER 
PT INCREASED AGITATION AND REFUSED TO BE CHANGED. PT REFUSED TO BE TOUCHED. ASSESSED PTS 
LINEN WITH NO SOIL NOTED. ASSESSED PTS DIAPER. DIAPER NOT SOILED. WILL CONTINUE TO MONITOR.

## 2020-09-28 NOTE — NUR
Individual Intervention: Pt was too manic and was not appropriate for an individual session 
at this time.

## 2020-09-29 VITALS — DIASTOLIC BLOOD PRESSURE: 65 MMHG | SYSTOLIC BLOOD PRESSURE: 142 MMHG

## 2020-09-29 VITALS — SYSTOLIC BLOOD PRESSURE: 113 MMHG | DIASTOLIC BLOOD PRESSURE: 61 MMHG

## 2020-09-29 VITALS — DIASTOLIC BLOOD PRESSURE: 58 MMHG | SYSTOLIC BLOOD PRESSURE: 120 MMHG

## 2020-09-29 LAB
ALBUMIN SERPL BCP-MCNC: 1.8 G/DL (ref 3.4–5)
ALP SERPL-CCNC: 80 U/L (ref 46–116)
ALT SERPL W P-5'-P-CCNC: 47 U/L (ref 12–78)
AST SERPL W P-5'-P-CCNC: 54 U/L (ref 15–37)
BASOPHILS # BLD AUTO: 0 /CMM (ref 0–0.2)
BASOPHILS NFR BLD AUTO: 0.1 % (ref 0–2)
BILIRUB SERPL-MCNC: 1 MG/DL (ref 0.2–1)
BUN SERPL-MCNC: 14 MG/DL (ref 7–18)
CALCIUM SERPL-MCNC: 7.7 MG/DL (ref 8.5–10.1)
CHLORIDE SERPL-SCNC: 103 MMOL/L (ref 98–107)
CO2 SERPL-SCNC: 26 MMOL/L (ref 21–32)
CREAT SERPL-MCNC: 0.6 MG/DL (ref 0.6–1.3)
EOSINOPHIL NFR BLD AUTO: 0.1 % (ref 0–6)
GLUCOSE SERPL-MCNC: 141 MG/DL (ref 74–106)
HCT VFR BLD AUTO: 38 % (ref 33–45)
HGB BLD-MCNC: 12.4 G/DL (ref 11.5–14.8)
LYMPHOCYTES NFR BLD AUTO: 1.3 /CMM (ref 0.8–4.8)
LYMPHOCYTES NFR BLD AUTO: 14.7 % (ref 20–44)
LYMPHOCYTES NFR BLD MANUAL: 12 % (ref 16–48)
MCHC RBC AUTO-ENTMCNC: 33 G/DL (ref 31–36)
MCV RBC AUTO: 89 FL (ref 82–100)
MONOCYTES NFR BLD AUTO: 1.4 /CMM (ref 0.1–1.3)
MONOCYTES NFR BLD AUTO: 15.7 % (ref 2–12)
MONOCYTES NFR BLD MANUAL: 16 % (ref 0–11)
NEUTROPHILS # BLD AUTO: 6.2 /CMM (ref 1.8–8.9)
NEUTROPHILS NFR BLD AUTO: 69.4 % (ref 43–81)
NEUTS SEG NFR BLD MANUAL: 72 % (ref 42–76)
PLATELET # BLD AUTO: 210 /CMM (ref 150–450)
POTASSIUM SERPL-SCNC: 2.9 MMOL/L (ref 3.5–5.1)
PROT SERPL-MCNC: 6.4 G/DL (ref 6.4–8.2)
RBC # BLD AUTO: 4.2 MIL/UL (ref 4–5.2)
SODIUM SERPL-SCNC: 139 MMOL/L (ref 136–145)
WBC NRBC COR # BLD AUTO: 9 K/UL (ref 4.3–11)

## 2020-09-29 RX ADMIN — Medication SCH ML: at 12:17

## 2020-09-29 RX ADMIN — Medication SCH EACH: at 08:12

## 2020-09-29 RX ADMIN — AMLODIPINE BESYLATE SCH MG: 5 TABLET ORAL at 08:13

## 2020-09-29 RX ADMIN — ACETAMINOPHEN PRN MG: 325 TABLET ORAL at 11:12

## 2020-09-29 RX ADMIN — ATORVASTATIN CALCIUM SCH MG: 40 TABLET, FILM COATED ORAL at 18:00

## 2020-09-29 RX ADMIN — ATENOLOL SCH MG: 50 TABLET ORAL at 08:13

## 2020-09-29 RX ADMIN — Medication SCH TAB: at 08:13

## 2020-09-29 RX ADMIN — Medication SCH ML: at 08:12

## 2020-09-29 RX ADMIN — Medication SCH ML: at 16:48

## 2020-09-29 RX ADMIN — Medication SCH MG: at 08:12

## 2020-09-30 VITALS — SYSTOLIC BLOOD PRESSURE: 153 MMHG | DIASTOLIC BLOOD PRESSURE: 75 MMHG

## 2020-09-30 VITALS — SYSTOLIC BLOOD PRESSURE: 137 MMHG | DIASTOLIC BLOOD PRESSURE: 61 MMHG

## 2020-09-30 LAB
BUN SERPL-MCNC: 21 MG/DL (ref 7–18)
CALCIUM SERPL-MCNC: 8.8 MG/DL (ref 8.5–10.1)
CHLORIDE SERPL-SCNC: 103 MMOL/L (ref 98–107)
CO2 SERPL-SCNC: 21 MMOL/L (ref 21–32)
CREAT SERPL-MCNC: 0.6 MG/DL (ref 0.6–1.3)
GLUCOSE SERPL-MCNC: 120 MG/DL (ref 74–106)
POTASSIUM SERPL-SCNC: 3.7 MMOL/L (ref 3.5–5.1)
SODIUM SERPL-SCNC: 136 MMOL/L (ref 136–145)

## 2020-09-30 RX ADMIN — AMLODIPINE BESYLATE SCH MG: 5 TABLET ORAL at 08:05

## 2020-09-30 RX ADMIN — Medication SCH EACH: at 08:06

## 2020-09-30 RX ADMIN — Medication SCH ML: at 08:04

## 2020-09-30 RX ADMIN — Medication SCH ML: at 16:19

## 2020-09-30 RX ADMIN — ACETAMINOPHEN PRN MG: 325 TABLET ORAL at 10:49

## 2020-09-30 RX ADMIN — Medication SCH TAB: at 08:05

## 2020-09-30 RX ADMIN — ATORVASTATIN CALCIUM SCH MG: 40 TABLET, FILM COATED ORAL at 17:49

## 2020-09-30 RX ADMIN — Medication SCH ML: at 12:25

## 2020-09-30 RX ADMIN — Medication SCH TAB: at 08:17

## 2020-09-30 RX ADMIN — AMLODIPINE BESYLATE SCH MG: 5 TABLET ORAL at 08:16

## 2020-09-30 RX ADMIN — ATENOLOL SCH MG: 50 TABLET ORAL at 08:06

## 2020-09-30 RX ADMIN — Medication SCH MG: at 08:05

## 2020-09-30 RX ADMIN — ATENOLOL SCH MG: 50 TABLET ORAL at 08:17

## 2020-09-30 RX ADMIN — Medication SCH MG: at 08:16

## 2020-09-30 NOTE — NUR
Family Contact: SW called the pts daughter Sarah ([phone]) and left a voicemail 
stating that the pt is in the process of being riesed.

## 2020-09-30 NOTE — NUR
RN NOTES: PT. REFUSED AM LABS  , RISKS & BENEFITS EXPLAINED , ENCOURAGED , PT. STILL REFUSED 
 AND PT. BEHAVIOR UNCOOPERTIVE, PARANOID,ANXIOUS,EASILY AGITATED AT THIS TIME, REQUESTING TO 
LAB TRY AGAIN LATER, WILL CONTINUITY WITH CARE.

## 2020-09-30 NOTE — NUR
GPS RN NOTE:

RECEIVED PATIENT IN THE ROOM RESTING IN BED AWAKE, NO COMPLAINS OF PAIN OR DISCOMFORT THIS 
TIME OF ASSESSMENT.PT REFUSED ALL HER AM MEDICATIONS PARANOID DELUSIONAL EXPLAIN RISK AND 
BENEFITS PT CONTINUE REFUSING  PT GETS EASILY AGITATED, CONFUSED, FORGETFUL, EASILY 
IRRITABLE, AND DEMANDING. WILL CONTINUE MONITORING FOR SAFETY AND BEHAVIOR Q 15 MIN

## 2020-10-01 VITALS — DIASTOLIC BLOOD PRESSURE: 66 MMHG | SYSTOLIC BLOOD PRESSURE: 141 MMHG

## 2020-10-01 VITALS — SYSTOLIC BLOOD PRESSURE: 151 MMHG | DIASTOLIC BLOOD PRESSURE: 63 MMHG

## 2020-10-01 RX ADMIN — ATENOLOL SCH MG: 50 TABLET ORAL at 08:36

## 2020-10-01 RX ADMIN — ACETAMINOPHEN PRN MG: 325 TABLET ORAL at 09:44

## 2020-10-01 RX ADMIN — Medication SCH ML: at 13:27

## 2020-10-01 RX ADMIN — Medication SCH EACH: at 08:35

## 2020-10-01 RX ADMIN — ATORVASTATIN CALCIUM SCH MG: 40 TABLET, FILM COATED ORAL at 18:00

## 2020-10-01 RX ADMIN — Medication SCH MG: at 08:35

## 2020-10-01 RX ADMIN — Medication SCH ML: at 08:41

## 2020-10-01 RX ADMIN — Medication SCH TAB: at 08:35

## 2020-10-01 RX ADMIN — Medication SCH ML: at 09:02

## 2020-10-01 RX ADMIN — HALOPERIDOL SCH MG: 1 TABLET ORAL at 14:30

## 2020-10-01 RX ADMIN — Medication SCH ML: at 17:00

## 2020-10-01 RX ADMIN — HALOPERIDOL SCH MG: 1 TABLET ORAL at 17:00

## 2020-10-01 RX ADMIN — AMLODIPINE BESYLATE SCH MG: 5 TABLET ORAL at 08:36

## 2020-10-01 NOTE — NUR
Family Contact/Riese Hearing Notification: ROOPA called the pts daughter Sarah 
([phone]) and left a voicemail stating that the question that she left on the SW's 
voicemail is more appropriate for the nursing staff and that the SW will provide her updates 
on the pts discharge planning. ROOPA stated that the update that she has for her is that the pt 
is going to be having a Riese hearing today as she has been refusing her medications. ROOPA 
stated that the MD will be petitioning the court to be able to give the pt her medications 
through injection when she refuses.

## 2020-10-01 NOTE — NUR
GPS/RN-NOTES

PATIENT C/O RIGHT LEG PAIN AND REQUESTING FOR TYLENOL. TYLENOL 650MG P.O GIVEN AS PRN ORDER. 
WILL CONT. MONITORING FOR SAFETY AND BEHAVIOR.

## 2020-10-01 NOTE — NUR
GPS/RN-NOTES

PATIENT REFUSED HALDOL 1MG P.O SCHEDULED AT 1520. HALDOL 1MG IM GIVEN AS ORDERED. PATIENT IS 
RIESE. DR. DARNELL MADE AWARE THAT  HALDOL 1MG P.O SCHEDULED AT 1700 NOT ADMINISTER DUE TO 
HALDOL 1MG IM WAS GIVEN AT 1633.

## 2020-10-01 NOTE — NUR
GPS/RN-NOTES

RECEIVED T.O ORDER FROM DR. DARNELL TO DISCONTINUE RISPERDAL I MG P.O AND ORDERED HALDOL 1MG 
P.O TID,AND GIVE HALDOL 1MG IM EVERY REFUSAL OF HALDOL P.O. DR. DARNELL STATED THAT PATIENT 
IS ALREADY ON RIESE.

## 2020-10-02 VITALS — DIASTOLIC BLOOD PRESSURE: 56 MMHG | SYSTOLIC BLOOD PRESSURE: 151 MMHG

## 2020-10-02 VITALS — SYSTOLIC BLOOD PRESSURE: 134 MMHG | DIASTOLIC BLOOD PRESSURE: 65 MMHG

## 2020-10-02 VITALS — DIASTOLIC BLOOD PRESSURE: 59 MMHG | SYSTOLIC BLOOD PRESSURE: 129 MMHG

## 2020-10-02 RX ADMIN — Medication SCH ML: at 12:07

## 2020-10-02 RX ADMIN — HALOPERIDOL SCH MG: 1 TABLET ORAL at 16:34

## 2020-10-02 RX ADMIN — AMLODIPINE BESYLATE SCH MG: 5 TABLET ORAL at 08:40

## 2020-10-02 RX ADMIN — HALOPERIDOL SCH MG: 1 TABLET ORAL at 08:41

## 2020-10-02 RX ADMIN — Medication SCH TAB: at 08:45

## 2020-10-02 RX ADMIN — ATENOLOL SCH MG: 50 TABLET ORAL at 08:41

## 2020-10-02 RX ADMIN — Medication SCH EACH: at 08:45

## 2020-10-02 RX ADMIN — Medication SCH MG: at 08:40

## 2020-10-02 RX ADMIN — ATORVASTATIN CALCIUM SCH MG: 40 TABLET, FILM COATED ORAL at 17:23

## 2020-10-02 RX ADMIN — Medication SCH ML: at 08:45

## 2020-10-02 RX ADMIN — HALOPERIDOL SCH MG: 1 TABLET ORAL at 12:07

## 2020-10-02 RX ADMIN — Medication SCH ML: at 16:34

## 2020-10-02 NOTE — NUR
RN NOTE- PT ALERT ORIENTED TO SELF ONLY CONFUSED COOPERATIVE THIS MORNING AND MEDICATION 
COMPLIANT PO INTAKE FAIR DIRECTABLE AND CALM DENIES AH VH

## 2020-10-03 VITALS — SYSTOLIC BLOOD PRESSURE: 123 MMHG | DIASTOLIC BLOOD PRESSURE: 63 MMHG

## 2020-10-03 VITALS — SYSTOLIC BLOOD PRESSURE: 140 MMHG | DIASTOLIC BLOOD PRESSURE: 61 MMHG

## 2020-10-03 VITALS — DIASTOLIC BLOOD PRESSURE: 58 MMHG | SYSTOLIC BLOOD PRESSURE: 122 MMHG

## 2020-10-03 RX ADMIN — HALOPERIDOL SCH MG: 1 TABLET ORAL at 08:35

## 2020-10-03 RX ADMIN — AMLODIPINE BESYLATE SCH MG: 5 TABLET ORAL at 08:36

## 2020-10-03 RX ADMIN — Medication SCH ML: at 08:38

## 2020-10-03 RX ADMIN — HALOPERIDOL SCH MG: 1 TABLET ORAL at 17:29

## 2020-10-03 RX ADMIN — ATENOLOL SCH MG: 50 TABLET ORAL at 08:38

## 2020-10-03 RX ADMIN — Medication SCH MG: at 08:35

## 2020-10-03 RX ADMIN — Medication SCH TAB: at 08:36

## 2020-10-03 RX ADMIN — Medication SCH EACH: at 08:35

## 2020-10-03 RX ADMIN — Medication SCH ML: at 12:21

## 2020-10-03 RX ADMIN — HALOPERIDOL SCH MG: 1 TABLET ORAL at 13:03

## 2020-10-03 RX ADMIN — ACETAMINOPHEN PRN MG: 325 TABLET ORAL at 22:27

## 2020-10-03 RX ADMIN — Medication SCH ML: at 17:29

## 2020-10-03 RX ADMIN — ATORVASTATIN CALCIUM SCH MG: 40 TABLET, FILM COATED ORAL at 17:29

## 2020-10-03 NOTE — NUR
GPS RN NOTE: 



PT. C/O OF STOMACH PAIN. ADMINISTERED TYLENOL 650 MG PO PRN AS ORDERED. WILL CONTINUE TO 
MONITOR FOR SAFETY AND BEHAVIOR

## 2020-10-04 VITALS — DIASTOLIC BLOOD PRESSURE: 56 MMHG | SYSTOLIC BLOOD PRESSURE: 140 MMHG

## 2020-10-04 VITALS — SYSTOLIC BLOOD PRESSURE: 128 MMHG | DIASTOLIC BLOOD PRESSURE: 58 MMHG

## 2020-10-04 VITALS — DIASTOLIC BLOOD PRESSURE: 66 MMHG | SYSTOLIC BLOOD PRESSURE: 117 MMHG

## 2020-10-04 RX ADMIN — HALOPERIDOL SCH MG: 1 TABLET ORAL at 09:07

## 2020-10-04 RX ADMIN — Medication SCH ML: at 16:37

## 2020-10-04 RX ADMIN — Medication SCH EACH: at 09:07

## 2020-10-04 RX ADMIN — ACETAMINOPHEN PRN MG: 325 TABLET ORAL at 23:42

## 2020-10-04 RX ADMIN — ACETAMINOPHEN PRN MG: 325 TABLET ORAL at 17:16

## 2020-10-04 RX ADMIN — HALOPERIDOL SCH MG: 1 TABLET ORAL at 16:37

## 2020-10-04 RX ADMIN — ACETAMINOPHEN PRN MG: 325 TABLET ORAL at 09:33

## 2020-10-04 RX ADMIN — Medication SCH ML: at 13:03

## 2020-10-04 RX ADMIN — TEMAZEPAM PRN MG: 7.5 CAPSULE ORAL at 23:42

## 2020-10-04 RX ADMIN — HALOPERIDOL SCH MG: 1 TABLET ORAL at 13:03

## 2020-10-04 RX ADMIN — ATORVASTATIN CALCIUM SCH MG: 40 TABLET, FILM COATED ORAL at 17:16

## 2020-10-04 RX ADMIN — Medication SCH ML: at 09:09

## 2020-10-04 RX ADMIN — Medication SCH TAB: at 09:08

## 2020-10-04 RX ADMIN — AMLODIPINE BESYLATE SCH MG: 5 TABLET ORAL at 09:07

## 2020-10-04 RX ADMIN — Medication SCH MG: at 09:06

## 2020-10-04 RX ADMIN — ATENOLOL SCH MG: 50 TABLET ORAL at 09:08

## 2020-10-04 NOTE — NUR
RN NOTE: PAIN

PT C/O 3/10 LEFT KNEE PAIN. ASSISTED PT TO REPOSITION FOR COMFORT AND MEDICATED WITH TYLENOL 
PO PRN.

## 2020-10-04 NOTE — NUR
GPS-RN NOTE: INSOMNIA



PATIENT UNABLE TO SLEEP. ADMINISTERED RESTORIL 7.5MG PO AS ORDERED. WILL CONTINUE TO MONITOR 
FOR PATIENT'S SAFETY.

## 2020-10-04 NOTE — NUR
GPS-RN NOTE:



PATIENT REFUSED WEEKLY SKIN ASSESSMENT. 

-------------------------------------------------------------------------------

Addendum: 10/05/20 at 0647 by NIKOS ARDON RN

-------------------------------------------------------------------------------

0600 - WHILE DOING MORNING CARE TO THE PATIENT. PATIENT AGREED TO HAVE HER SKIN CHECK. 
PLACED PICTURES IN THE CHART FOR WEEKLY SKIN ASSESSMENT. WILL ENDORSED TO THE DAY SHIFT 
NURSE.

## 2020-10-04 NOTE — NUR
GPS-RN NOTE:



PATIENT C/O BILATERAL KNEE PAIN, ON A PAIN SCALE OF 3/10. ADMINISTERED ACETAMINOPHEN 650MG 
PO AS ORDERED. WILL CONTINUE TO REASSESS.

## 2020-10-05 VITALS — SYSTOLIC BLOOD PRESSURE: 132 MMHG | DIASTOLIC BLOOD PRESSURE: 58 MMHG

## 2020-10-05 VITALS — SYSTOLIC BLOOD PRESSURE: 125 MMHG | DIASTOLIC BLOOD PRESSURE: 66 MMHG

## 2020-10-05 VITALS — DIASTOLIC BLOOD PRESSURE: 52 MMHG | SYSTOLIC BLOOD PRESSURE: 110 MMHG

## 2020-10-05 LAB
BASOPHILS # BLD AUTO: 0 /CMM (ref 0–0.2)
BASOPHILS NFR BLD AUTO: 0.3 % (ref 0–2)
BUN SERPL-MCNC: 8 MG/DL (ref 7–18)
CALCIUM SERPL-MCNC: 8.6 MG/DL (ref 8.5–10.1)
CHLORIDE SERPL-SCNC: 102 MMOL/L (ref 98–107)
CO2 SERPL-SCNC: 26 MMOL/L (ref 21–32)
CREAT SERPL-MCNC: 0.5 MG/DL (ref 0.6–1.3)
EOSINOPHIL NFR BLD AUTO: 0.9 % (ref 0–6)
GLUCOSE SERPL-MCNC: 83 MG/DL (ref 74–106)
HCT VFR BLD AUTO: 34 % (ref 33–45)
HGB BLD-MCNC: 11.4 G/DL (ref 11.5–14.8)
LYMPHOCYTES NFR BLD AUTO: 2.5 /CMM (ref 0.8–4.8)
LYMPHOCYTES NFR BLD AUTO: 26.5 % (ref 20–44)
MAGNESIUM SERPL-MCNC: 1.8 MG/DL (ref 1.8–2.4)
MCHC RBC AUTO-ENTMCNC: 33 G/DL (ref 31–36)
MCV RBC AUTO: 90 FL (ref 82–100)
MONOCYTES NFR BLD AUTO: 1.1 /CMM (ref 0.1–1.3)
MONOCYTES NFR BLD AUTO: 12 % (ref 2–12)
NEUTROPHILS # BLD AUTO: 5.7 /CMM (ref 1.8–8.9)
NEUTROPHILS NFR BLD AUTO: 60.3 % (ref 43–81)
PHOSPHATE SERPL-MCNC: 3.1 MG/DL (ref 2.5–4.9)
PLATELET # BLD AUTO: 318 /CMM (ref 150–450)
POTASSIUM SERPL-SCNC: 3.5 MMOL/L (ref 3.5–5.1)
RBC # BLD AUTO: 3.78 MIL/UL (ref 4–5.2)
SODIUM SERPL-SCNC: 136 MMOL/L (ref 136–145)
WBC NRBC COR # BLD AUTO: 9.4 K/UL (ref 4.3–11)

## 2020-10-05 RX ADMIN — Medication SCH TAB: at 08:08

## 2020-10-05 RX ADMIN — Medication SCH ML: at 08:16

## 2020-10-05 RX ADMIN — HALOPERIDOL SCH MG: 1 TABLET ORAL at 17:08

## 2020-10-05 RX ADMIN — ATENOLOL SCH MG: 50 TABLET ORAL at 08:08

## 2020-10-05 RX ADMIN — TEMAZEPAM PRN MG: 7.5 CAPSULE ORAL at 22:26

## 2020-10-05 RX ADMIN — HALOPERIDOL SCH MG: 1 TABLET ORAL at 08:09

## 2020-10-05 RX ADMIN — HALOPERIDOL SCH MG: 1 TABLET ORAL at 12:02

## 2020-10-05 RX ADMIN — ATORVASTATIN CALCIUM SCH MG: 40 TABLET, FILM COATED ORAL at 17:08

## 2020-10-05 RX ADMIN — Medication SCH EACH: at 08:08

## 2020-10-05 RX ADMIN — Medication SCH ML: at 12:03

## 2020-10-05 RX ADMIN — Medication SCH ML: at 17:08

## 2020-10-05 RX ADMIN — Medication SCH MG: at 08:09

## 2020-10-05 RX ADMIN — ACETAMINOPHEN PRN MG: 325 TABLET ORAL at 09:20

## 2020-10-05 RX ADMIN — AMLODIPINE BESYLATE SCH MG: 5 TABLET ORAL at 08:09

## 2020-10-05 NOTE — NUR
Family Contact:

This writer contacted patient's daughter Sarah ([phone]) and stated pt will be 
discharged 10/6 she is aware and agreeable.

## 2020-10-05 NOTE — NUR
GPS RN NOTES: INSOMNIA 

UPON DOING ROUNDS, PT PRAISING AND AWAKE. ENCOURAGE PT TP EXPRESS THOUGHTS AND FEELINGS. PT 
STATED, "I CANT SLEEP." OFFERED RESTORIL PRN PO AS ORDERED. PT AGREED AND TOLERATED 
MEDICATION WELL. CONTINUE TO MONITOR.

## 2020-10-05 NOTE — NUR
RN NOTE: PAIN

PT C/O 3/10 LEFT LOWER LEG PAIN. REQUESTING MEDICATION. MEDICATED WITH TYLENOL 650 MG PO PRN

## 2020-10-06 VITALS — SYSTOLIC BLOOD PRESSURE: 137 MMHG | DIASTOLIC BLOOD PRESSURE: 66 MMHG

## 2020-10-06 RX ADMIN — HALOPERIDOL SCH MG: 1 TABLET ORAL at 12:15

## 2020-10-06 RX ADMIN — HALOPERIDOL SCH MG: 1 TABLET ORAL at 08:24

## 2020-10-06 RX ADMIN — Medication SCH ML: at 12:15

## 2020-10-06 RX ADMIN — Medication SCH EACH: at 08:24

## 2020-10-06 RX ADMIN — ACETAMINOPHEN PRN MG: 325 TABLET ORAL at 08:24

## 2020-10-06 RX ADMIN — Medication SCH ML: at 08:23

## 2020-10-06 RX ADMIN — Medication SCH TAB: at 08:24

## 2020-10-06 RX ADMIN — AMLODIPINE BESYLATE SCH MG: 5 TABLET ORAL at 08:24

## 2020-10-06 RX ADMIN — Medication SCH MG: at 08:26

## 2020-10-06 RX ADMIN — ATENOLOL SCH MG: 50 TABLET ORAL at 08:25

## 2020-10-06 NOTE — NUR
SW Discharge Note: 



Patient will be discharged to skilled nursing facility to [facility] [address] [place] [address]; ([phone]) via Ambulance 
transportation at 11:00am. Patients daughter Sarah ([phone]) is aware and agreeable 
with discharge.  spoke with Blessing vargas, at [facility] 
([phone]) who stated patient will be accepted at facility today. Patient is alert and 
oriented x1-2, and is not able to plan for self-care at this time, but is willing to accept 
care provided for her at the facility. Patient denies any suicidal or homicidal ideations. 
Patient is aware and agreeable with discharge plans. Patient will continue to follow-up with 
her (psychiatrist) Dr. Red and (internist) Dr. Johnson. Patient presents with euthymic 
mood and congruent affect.

## 2020-10-06 NOTE — NUR
RN DISCHARGE NOTE:

75 YEAR OLD FEMALE DISCHARGED TO [facility] IN STABLE 
CONDITION. COMPLIANT WITH MEDICATIONS, COOPERATIVE WITH TREATMENT PLANS. PATIENT DENIES 
SI/HI AND INSTRUCTED TO GO TO THE CLOSEST ER IF DEVELOPING SI/HI. BEHAVIOR IMPROVED, 
PSYCHIATRIC TREATMENT PLANS MET, MEDICAL TREATMENT PLANS DEFERRED FOR CONTINUAL MONITORING. 
EDUCATED PT ABOUT AFTER CARE PLAN AND COPY PROVIDED. RETURNED PERSONAL BELONGINGS TO 
PATIENT. MEDICATIONS RECONCILED WITH DR. DARNELL AND RAFIA MALIK. REPORT GIVEN TO ALESSANDRA SIFUENTES FOR 
CONTINUITY OF CARE. PATIENT UNABLE TO SIGN DISCHARGE PAPERWORK. SKIN INTACT ON ADMIT AND 
DISCHARGE. PATIENT LEFT THE UNIT VIA GURNEY WITH EMS AT 1425.